# Patient Record
Sex: MALE | Race: WHITE | URBAN - METROPOLITAN AREA
[De-identification: names, ages, dates, MRNs, and addresses within clinical notes are randomized per-mention and may not be internally consistent; named-entity substitution may affect disease eponyms.]

---

## 2022-06-22 ENCOUNTER — OFFICE VISIT (OUTPATIENT)
Dept: URBAN - METROPOLITAN AREA CLINIC 30 | Facility: CLINIC | Age: 37
End: 2022-06-22
Payer: COMMERCIAL

## 2022-06-22 DIAGNOSIS — Z79.4 LONG TERM (CURRENT) USE OF INSULIN: ICD-10-CM

## 2022-06-22 DIAGNOSIS — E11.9 TYPE 2 DIABETES MELLITUS W/O COMPLICATION: Primary | ICD-10-CM

## 2022-06-22 DIAGNOSIS — H52.03 HYPERMETROPIA, BILATERAL: ICD-10-CM

## 2022-06-22 PROCEDURE — 92004 COMPRE OPH EXAM NEW PT 1/>: CPT | Performed by: OPTOMETRIST

## 2022-06-22 PROCEDURE — 92134 CPTRZ OPH DX IMG PST SGM RTA: CPT | Performed by: OPTOMETRIST

## 2022-06-22 ASSESSMENT — INTRAOCULAR PRESSURE
OD: 15
OS: 14

## 2022-06-22 ASSESSMENT — VISUAL ACUITY
OS: 20/20
OD: 20/20

## 2022-06-22 ASSESSMENT — KERATOMETRY
OD: 44.94
OS: 44.67

## 2022-06-22 NOTE — IMPRESSION/PLAN
Impression: Type 2 diabetes mellitus w/o complication: B36.5. Plan: No retinopathy on exam today. Patient educated on importance of BG control. Monitor annually.

## 2022-06-22 NOTE — IMPRESSION/PLAN
Impression: Hypermetropia, bilateral: H52.03. Plan: Apparent refractive shift due to elevated BG, though pt notes h/o wearing glasses in childhood for farsightedness. Rec otc readers +1.50 for now and can rtc for refraction check once BG is stable.

## 2022-06-22 NOTE — IMPRESSION/PLAN
Impression: Long term (current) use of insulin: Z79.4. Plan: Dx 6/2022. Short term actually. A21c 6/15/22: 10.5 BG: 160 6/22/22 Recent steroid course for sinus infection- 1 week 6/2022 and previously noting both courses precipitated elevated BG. Pt notes this happened previously on pred PO course for bronchitis.

## 2025-02-14 ENCOUNTER — APPOINTMENT (OUTPATIENT)
Dept: URBAN - METROPOLITAN AREA CLINIC 323 | Facility: CLINIC | Age: 40
Setting detail: DERMATOLOGY
End: 2025-02-14

## 2025-02-14 DIAGNOSIS — L63.8 OTHER ALOPECIA AREATA: ICD-10-CM

## 2025-02-14 PROCEDURE — ? FULL BODY SKIN EXAM - DECLINED

## 2025-02-14 PROCEDURE — ? COUNSELING

## 2025-02-14 PROCEDURE — 99202 OFFICE O/P NEW SF 15 MIN: CPT

## 2025-02-14 ASSESSMENT — LOCATION DETAILED DESCRIPTION DERM
LOCATION DETAILED: LEFT SUPERIOR LATERAL BUCCAL CHEEK
LOCATION DETAILED: RIGHT SUPERIOR LATERAL BUCCAL CHEEK

## 2025-02-14 ASSESSMENT — LOCATION SIMPLE DESCRIPTION DERM
LOCATION SIMPLE: RIGHT CHEEK
LOCATION SIMPLE: LEFT CHEEK

## 2025-02-14 ASSESSMENT — LOCATION ZONE DERM: LOCATION ZONE: FACE

## 2025-02-14 NOTE — PROCEDURE: COUNSELING
Detail Level: Simple
Patient Specific Counseling (Will Not Stick From Patient To Patient): -Pt advised to follow up with PIETRO Koenig, for PRP treatment on beard.

## 2025-02-14 NOTE — HPI: HAIR LOSS
Additional History: Pt reports that he had an IPL laser treatment around his beard area in December 2024 at a [facility]. He states he started noticing bald patches in his beard but was not aware of potential hair loss.
